# Patient Record
Sex: FEMALE | Race: AMERICAN INDIAN OR ALASKA NATIVE
[De-identification: names, ages, dates, MRNs, and addresses within clinical notes are randomized per-mention and may not be internally consistent; named-entity substitution may affect disease eponyms.]

---

## 2018-02-12 ENCOUNTER — HOSPITAL ENCOUNTER (OUTPATIENT)
Dept: HOSPITAL 5 - SPVWC | Age: 58
Discharge: HOME | End: 2018-02-12
Attending: INTERNAL MEDICINE
Payer: MEDICARE

## 2018-02-12 DIAGNOSIS — Z12.31: Primary | ICD-10-CM

## 2018-02-12 PROCEDURE — 77067 SCR MAMMO BI INCL CAD: CPT

## 2018-02-12 NOTE — MAMMOGRAPHY REPORT
Bilateral mammogram: Compared to 9/6/16. CAD study utilized.



Findings:



Predominance adipose tissue bilaterally. No mass or microcalcification. 

Benign density left breast without interval change. Benign maxilla.



Impression:



Benign findings. Annual followup recommended. BI-RADS CATEGORY:  2 = 

Benign



ACR BI-RADS MAMMOGRAPHIC CODES:

0 = Needs additional imaging evaluation; 1 = Negative; 2 = Benign; 3 = 

Probably benign; 4 = Suspicious; 5 = Malignant; 6 = Known biopsy-proven 

malignancy



COMMENT:

      1.   Dense breast tissue, i.e., adenosis, fibrocystic 

            changes, etc., may obscure an underlying neoplasm.

      2.   Approximately 10% of cancers are not detected with

            mammography.

      3.   A negative mammography report should not delay biopsy 

            if a clinically suspicious mass is present. COMMENT:

Patient follow-up letters are generated in [employer].

## 2018-07-26 ENCOUNTER — HOSPITAL ENCOUNTER (OUTPATIENT)
Dept: HOSPITAL 5 - SPVIMAG | Age: 58
Discharge: HOME | End: 2018-07-26
Attending: INTERNAL MEDICINE
Payer: MEDICARE

## 2018-07-26 DIAGNOSIS — J18.9: ICD-10-CM

## 2018-07-26 DIAGNOSIS — J45.41: Primary | ICD-10-CM

## 2018-07-26 PROCEDURE — 71046 X-RAY EXAM CHEST 2 VIEWS: CPT

## 2018-07-26 NOTE — XRAY REPORT
CHEST TWO VIEWS: 07/26/18 16:03



CLINICAL: Moderate persistent asthma with acute exacerbation.



COMPARISON: None



FINDINGS: Abnormal opacification in the right lung base on the frontal 

view with partial silhouetting of the right hemidiaphragm and the right 

heart border.  The right upper lobe is clear and the left lung is 

clear.  No obvious pleural effusion.  The left hemidiaphragm is 

elevated compared to the right.  Normal heart and pulmonary vessels.  

The bones and soft tissues are unremarkable.





IMPRESSION: Right basal pneumonia with involvement of both right middle 

lobe and right lower lobe.

## 2018-08-08 ENCOUNTER — HOSPITAL ENCOUNTER (OUTPATIENT)
Dept: HOSPITAL 5 - SPVIMAG | Age: 58
Discharge: HOME | End: 2018-08-08
Attending: INTERNAL MEDICINE
Payer: MEDICARE

## 2018-08-08 DIAGNOSIS — J18.9: Primary | ICD-10-CM

## 2018-08-08 PROCEDURE — 71046 X-RAY EXAM CHEST 2 VIEWS: CPT

## 2018-08-08 NOTE — XRAY REPORT
CHEST TWO VIEWS: 08/08/18 12:40:00



CLINICAL: Followup pneumonia.



COMPARISON: 07/26/18



FINDINGS: No significant change in right basal opacification on the 

frontal view.  However, the right lower lobe is clear on the lateral 

view.  No pleural effusion.  The right upper lobe is clear and the left 

lung is clear.  Stable elevation of left hemidiaphragm.  Normal heart 

and pulmonary vessels.The bones and soft tissues are unremarkable.



IMPRESSION: Process and right basal pneumonia with little change.  

Consider CT Chest with contrast.

## 2018-08-21 ENCOUNTER — HOSPITAL ENCOUNTER (OUTPATIENT)
Dept: HOSPITAL 5 - CT | Age: 58
Discharge: HOME | End: 2018-08-21
Attending: INTERNAL MEDICINE
Payer: MEDICARE

## 2018-08-21 DIAGNOSIS — R93.8: Primary | ICD-10-CM

## 2018-08-21 LAB — BUN SERPL-MCNC: 12 MG/DL (ref 7–17)

## 2018-08-21 PROCEDURE — 82565 ASSAY OF CREATININE: CPT

## 2018-08-21 PROCEDURE — 71260 CT THORAX DX C+: CPT

## 2018-08-21 PROCEDURE — 36415 COLL VENOUS BLD VENIPUNCTURE: CPT

## 2018-08-21 PROCEDURE — 84520 ASSAY OF UREA NITROGEN: CPT

## 2018-08-21 NOTE — CAT SCAN REPORT
FINAL REPORT



EXAM:  CT CHEST W CON



HISTORY:  ABNORMAL FINDINGS ON BODY STRUCTURES 



TECHNIQUE:  CT examination of the chest after IV contrast



PRIORS:  Two-view chest 8/8/2018. Comparison radiology report

requested but not sent 



FINDINGS:  

Large body habitus limits the examination.  Increased soft tissue

attenuates the CT x-ray beam and degrades image quality. No

filling defect in the visible pulmonary arteries. Normal cardiac

size without pericardial effusion. Intact normal caliber thoracic

aorta. Normal-appearing esophagus.



No evidence of hilar mass or mediastinal adenopathy. Prominent

anterior mediastinal and pericardial fat. Degenerative change in

the regional skeleton. No evidence of acute fracture. 



No pneumothorax or pleural effusion. Linear scar versus

atelectasis lateral inferior left lingula. 



IMPRESSION:  

Linear scar versus atelectasis lateral inferior left lingular

region

## 2020-02-17 NOTE — MAMMOGRAPHY REPORT
DIGITAL SCREENING MAMMOGRAM WITH CAD, 2/14/2020



INDICATION: Routine screening mammography. 



TECHNIQUE:  Digital bilateral  2D mammography was obtained in the craniocaudal and mediolateral obliq
ue projections. This examination was interpreted with the benefit of Computer-Aided Detection analysi
s.



COMPARISON: 2/13/2019



FINDINGS: 



Breast Density: The breasts are almost entirely fatty.



There is no evidence of dominant mass, suspicious calcifications or architectural distortion in eithe
r breast.



IMPRESSION: No mammographic evidence of malignancy.





Follow up recommendation: Routine yearly



BI-RADS Category 1:  Negative.



A "normal" or negative report should not discourage follow up or biopsy of a clinically significant f
inding.



A written summary of these findings will be mailed to the patient. The patient will be entered into a
 mammography reporting system which will generate a reminder letter for the patient's next appointmen
t at the appropriate interval.



The [employer] recommends yearly mammograms starting at age 40 and continuing as l
mamie as a woman is in good health.  Breast MRI is recommended for women with an approximate 20-25% or 
greater lifetime risk of breast cancer, including women with a strong family history of breast or ova
victor manuel cancer or who have been treated for Hodgkin's disease.



Signer Name: Fer King MD 

Signed: 2/17/2020 10:51 AM

 Workstation Name: [id number]